# Patient Record
Sex: MALE | Race: WHITE | NOT HISPANIC OR LATINO | Employment: FULL TIME | URBAN - METROPOLITAN AREA
[De-identification: names, ages, dates, MRNs, and addresses within clinical notes are randomized per-mention and may not be internally consistent; named-entity substitution may affect disease eponyms.]

---

## 2022-09-10 ENCOUNTER — ANCILLARY PROCEDURE (OUTPATIENT)
Dept: GENERAL RADIOLOGY | Facility: CLINIC | Age: 32
End: 2022-09-10
Attending: FAMILY MEDICINE
Payer: OTHER MISCELLANEOUS

## 2022-09-10 ENCOUNTER — NURSE TRIAGE (OUTPATIENT)
Dept: NURSING | Facility: CLINIC | Age: 32
End: 2022-09-10

## 2022-09-10 ENCOUNTER — OFFICE VISIT (OUTPATIENT)
Dept: URGENT CARE | Facility: URGENT CARE | Age: 32
End: 2022-09-10
Payer: OTHER MISCELLANEOUS

## 2022-09-10 VITALS — HEART RATE: 82 BPM | DIASTOLIC BLOOD PRESSURE: 76 MMHG | TEMPERATURE: 98 F | SYSTOLIC BLOOD PRESSURE: 136 MMHG

## 2022-09-10 DIAGNOSIS — L03.113 CELLULITIS OF RIGHT HAND: ICD-10-CM

## 2022-09-10 DIAGNOSIS — L03.113 CELLULITIS OF RIGHT HAND: Primary | ICD-10-CM

## 2022-09-10 PROCEDURE — 99203 OFFICE O/P NEW LOW 30 MIN: CPT | Mod: 25 | Performed by: FAMILY MEDICINE

## 2022-09-10 PROCEDURE — 90714 TD VACC NO PRESV 7 YRS+ IM: CPT | Performed by: FAMILY MEDICINE

## 2022-09-10 PROCEDURE — 73130 X-RAY EXAM OF HAND: CPT | Mod: TC | Performed by: RADIOLOGY

## 2022-09-10 PROCEDURE — 90471 IMMUNIZATION ADMIN: CPT | Performed by: FAMILY MEDICINE

## 2022-09-10 RX ORDER — VALACYCLOVIR HYDROCHLORIDE 1 G/1
TABLET, FILM COATED ORAL
COMMUNITY
Start: 2021-11-01

## 2022-09-10 RX ORDER — CEPHALEXIN 500 MG/1
500 CAPSULE ORAL 3 TIMES DAILY
Qty: 21 CAPSULE | Refills: 0 | Status: SHIPPED | OUTPATIENT
Start: 2022-09-10 | End: 2022-09-17

## 2022-09-10 RX ORDER — SULFAMETHOXAZOLE/TRIMETHOPRIM 800-160 MG
1 TABLET ORAL 2 TIMES DAILY
Qty: 14 TABLET | Refills: 0 | Status: SHIPPED | OUTPATIENT
Start: 2022-09-10 | End: 2022-09-17

## 2022-09-10 NOTE — PROGRESS NOTES
Prior to immunization administration, verified patients identity using patient s name and date of birth. Please see Immunization Activity for additional information.     Screening Questionnaire for Adult Immunization    Are you sick today?   No   Do you have allergies to medications, food, a vaccine component or latex?   No   Have you ever had a serious reaction after receiving a vaccination?   No   Do you have a long-term health problem with heart, lung, kidney, or metabolic disease (e.g., diabetes), asthma, a blood disorder, no spleen, complement component deficiency, a cochlear implant, or a spinal fluid leak?  Are you on long-term aspirin therapy?   No   Do you have cancer, leukemia, HIV/AIDS, or any other immune system problem?   No   Do you have a parent, brother, or sister with an immune system problem?   No   In the past 3 months, have you taken medications that affect  your immune system, such as prednisone, other steroids, or anticancer drugs; drugs for the treatment of rheumatoid arthritis, Crohn s disease, or psoriasis; or have you had radiation treatments?   No   Have you had a seizure, or a brain or other nervous system problem?   No   During the past year, have you received a transfusion of blood or blood    products, or been given immune (gamma) globulin or antiviral drug?   No   For women: Are you pregnant or is there a chance you could become       pregnant during the next month?   No   Have you received any vaccinations in the past 4 weeks?   No     Immunization questionnaire answers were all negative.        Per orders of Torrey Alvarez , injection of TD given by Natasha Pearson CMA. Patient instructed to remain in clinic for 15 minutes afterwards, and to report any adverse reaction to me immediately.       Screening performed by Natasha Pearson CMA on 9/10/2022 at 6:17 PM.

## 2022-09-10 NOTE — PROGRESS NOTES
Clinic Administered Medication Documentation          Injectable Medication Documentation    Patient was given TD. Prior to medication administration, verified patients identity using patient s name and date of birth. Please see MAR and medication order for additional information. Patient instructed to remain in clinic for 15 minutes.      Was entire vial of medication used? Yes  Vial/Syringe: Single dose vial  Expiration Date:  06/24/2023  Was this medication supplied by the patient? No

## 2022-09-10 NOTE — PROGRESS NOTES
Assessment & Plan     Cellulitis of right hand  He appears to have an area of cellulitis on top of the right hand with the wound central to it.  No fluctuance was appreciated and x-ray was normal.  Tetanus booster was provided and I have suggested antibiotic therapy to cover strep and community-acquired MRSA with cephalexin and Bactrim DS.  Discussed expected prognosis that the rash may spread for another 24 hours.  Discussed potential complications and indications to seek care.  Follow-up at this time would be as needed.  Tylenol or Advil can be used for pain.  - XR Hand Right G/E 3 Views; Future  - TD PRESERV FREE, IM (7+ YRS) (DECAVAC/TENIVAC)  - cephALEXin (KEFLEX) 500 MG capsule; Take 1 capsule (500 mg) by mouth 3 times daily for 7 days  - sulfamethoxazole-trimethoprim (BACTRIM DS) 800-160 MG tablet; Take 1 tablet by mouth 2 times daily for 7 days             Nicotine/Tobacco Cessation:  He reports that he has been smoking. He does not have any smokeless tobacco history on file.  Nicotine/Tobacco Cessation Plan:             No follow-ups on file.    Torrey Rendon MD  [facility] MARC Anaya is a 32 year old, presenting for the following health issues:  Urgent Care (Present for puncture wound on right knuckle from a nail during work yesterday afternoon - reports there is swelling with pain and redness that is spreading. ) and Work Comp      HPI       Date of injury September 9, 2022, employer [employer].    Patient was at work yesterday and hit a nail that was in 2 x 4 near the MCP joint middle finger, right hand, extensor surface.  Immediately painful but increasingly so over the last 24 hours where he is developed an area of swelling and erythema around the wound.  No drainage has been noted.  Systemically he feels well.    Review of Systems   Constitutional, HEENT, cardiovascular, pulmonary, gi and gu systems are negative, except as otherwise noted.       Objective    /76 (BP Location: Left arm, Patient Position: Sitting, Cuff Size: Adult Regular)   Pulse 82   Temp 98  F (36.7  C) (Tympanic)   There is no height or weight on file to calculate BMI.  Physical Exam   GENERAL: healthy, alert and no distress  EYES: Eyes grossly normal to inspection, PERRL and conjunctivae and sclerae normal  MS: no gross musculoskeletal defects noted, no edema  MS: The right hand is normal in appearance except for a small wound over the extensor aspect right middle finger MCP joint.  There is surrounding erythema that is warm and indurated but no fluctuance, about 3 to 4 cm in diameter.  There is no lymphangitic streaking or evidence of proximal spread into the forearm.  Range of motion at the finger is diminished by the swelling but I am able to move the MCP joint without significant pain and there is no tenderness, erythema, or swelling in the palm of the hand.  SKIN: no suspicious lesions or rashes  NEURO: Normal strength and tone, mentation intact and speech normal  PSYCH: mentation appears normal, affect normal/bright    Xray - Reviewed and interpreted by me.  X-ray, right hand, 3 views, normal without evidence of fracture or foreign body.

## 2022-09-11 NOTE — TELEPHONE ENCOUNTER
Call from patient who says [facility] is closed and he needs his medication resent to nearby [facility] [address].    Advised Rxs were sent to [facility] on [address].    Patient verbalized understanding.        Reason for Disposition    Caller has medicine question only, adult not sick, AND triager answers question    Protocols used: MEDICATION QUESTION CALL-A-